# Patient Record
Sex: FEMALE | Race: WHITE | URBAN - METROPOLITAN AREA
[De-identification: names, ages, dates, MRNs, and addresses within clinical notes are randomized per-mention and may not be internally consistent; named-entity substitution may affect disease eponyms.]

---

## 2021-08-18 ENCOUNTER — OFFICE VISIT (OUTPATIENT)
Dept: URBAN - METROPOLITAN AREA CLINIC 33 | Facility: CLINIC | Age: 31
End: 2021-08-18
Payer: COMMERCIAL

## 2021-08-18 DIAGNOSIS — H53.8 OTHER VISUAL DISTURBANCES: Primary | ICD-10-CM

## 2021-08-18 PROCEDURE — 99203 OFFICE O/P NEW LOW 30 MIN: CPT | Performed by: OPTOMETRIST

## 2021-08-18 ASSESSMENT — INTRAOCULAR PRESSURE
OD: 19
OS: 19

## 2021-08-18 NOTE — IMPRESSION/PLAN
Impression: Other visual disturbances: H53.8. Plan: Ocular health unremarkable Discussed floaters are a result of severe myopia. Patient's symptoms began 8 months ago and are well tolerated, no signs of retinal disease or detachment. Discussed s/s of RD and need of immediate evaluation if they occur.

## 2021-08-27 ENCOUNTER — TESTING ONLY (OUTPATIENT)
Dept: URBAN - METROPOLITAN AREA CLINIC 33 | Facility: CLINIC | Age: 31
End: 2021-08-27
Payer: COMMERCIAL

## 2021-08-27 DIAGNOSIS — H52.13 MYOPIA, BILATERAL: Primary | ICD-10-CM

## 2021-08-27 ASSESSMENT — VISUAL ACUITY
OD: 20/20
OS: 20/20

## 2021-08-27 NOTE — IMPRESSION/PLAN
Impression: Myopia, bilateral: H52.13. Plan: Educated patient about today's findings. Order refraction to determine patient's best corrected visual acuity. Patient was unsure if she had prism in glasses, no prism in current glasses or new glasses. Briefly discussed ICL, patient may return for LASIK consult.

## 2024-06-03 ENCOUNTER — OFFICE VISIT (OUTPATIENT)
Dept: URBAN - METROPOLITAN AREA CLINIC 33 | Facility: CLINIC | Age: 34
End: 2024-06-03

## 2024-06-03 DIAGNOSIS — H04.123 DRY EYE SYNDROME OF BILATERAL LACRIMAL GLANDS: Primary | ICD-10-CM

## 2024-06-03 DIAGNOSIS — H52.13 MYOPIA, BILATERAL: ICD-10-CM

## 2024-06-03 PROCEDURE — 99213 OFFICE O/P EST LOW 20 MIN: CPT

## 2024-06-03 RX ORDER — LAMOTRIGINE 150 MG/1
150 MG TABLET ORAL
Qty: 0 | Refills: 0 | Status: ACTIVE
Start: 2024-06-03

## 2024-06-03 RX ORDER — SUMATRIPTAN SUCCINATE 25 MG/1
25 MG TABLET, FILM COATED ORAL
Qty: 0 | Refills: 0 | Status: ACTIVE
Start: 2024-06-03

## 2024-06-03 RX ORDER — ACYCLOVIR 200 MG/1
200 MG CAPSULE ORAL
Qty: 0 | Refills: 0 | Status: ACTIVE
Start: 2024-06-03

## 2024-06-03 ASSESSMENT — VISUAL ACUITY
OS: 20/20
OD: 20/20

## 2024-06-03 ASSESSMENT — INTRAOCULAR PRESSURE
OD: 16
OS: 16

## 2024-06-17 ENCOUNTER — OFFICE VISIT (OUTPATIENT)
Dept: URBAN - METROPOLITAN AREA CLINIC 33 | Facility: CLINIC | Age: 34
End: 2024-06-17

## 2024-06-17 DIAGNOSIS — H52.13 MYOPIA, BILATERAL: Primary | ICD-10-CM

## 2024-06-17 PROCEDURE — 92310 CONTACT LENS FITTING OU: CPT

## 2025-05-15 ENCOUNTER — OFFICE VISIT (OUTPATIENT)
Dept: URBAN - METROPOLITAN AREA CLINIC 33 | Facility: CLINIC | Age: 35
End: 2025-05-15
Payer: COMMERCIAL

## 2025-05-15 DIAGNOSIS — H43.393 OTHER VITREOUS OPACITIES, BILATERAL: Primary | ICD-10-CM

## 2025-05-15 PROCEDURE — 99213 OFFICE O/P EST LOW 20 MIN: CPT

## 2025-05-15 ASSESSMENT — INTRAOCULAR PRESSURE
OD: 18
OS: 17

## 2025-07-15 ENCOUNTER — OFFICE VISIT (OUTPATIENT)
Dept: URBAN - METROPOLITAN AREA CLINIC 33 | Facility: CLINIC | Age: 35
End: 2025-07-15

## 2025-07-15 DIAGNOSIS — H52.13 MYOPIA, BILATERAL: Primary | ICD-10-CM

## 2025-07-15 PROCEDURE — 92310 CONTACT LENS FITTING OU: CPT

## 2025-07-15 ASSESSMENT — VISUAL ACUITY
OS: 20/20
OD: 20/20

## 2025-07-15 ASSESSMENT — INTRAOCULAR PRESSURE
OS: 17
OD: 17

## 2025-07-15 ASSESSMENT — KERATOMETRY
OD: 46.38
OS: 46.38